# Patient Record
Sex: MALE | Race: WHITE | NOT HISPANIC OR LATINO | Employment: FULL TIME | URBAN - METROPOLITAN AREA
[De-identification: names, ages, dates, MRNs, and addresses within clinical notes are randomized per-mention and may not be internally consistent; named-entity substitution may affect disease eponyms.]

---

## 2018-01-26 ENCOUNTER — CLINICAL SUPPORT (OUTPATIENT)
Dept: URGENT CARE | Facility: CLINIC | Age: 48
End: 2018-01-26

## 2018-01-26 DIAGNOSIS — Z13.39 ALCOHOL SCREENING: ICD-10-CM

## 2018-01-26 DIAGNOSIS — Z02.83 ENCOUNTER FOR DRUG SCREENING: Primary | ICD-10-CM

## 2018-01-26 LAB — POC BREATH ALCOHOL: NEGATIVE

## 2018-01-26 PROCEDURE — 80305 DRUG TEST PRSMV DIR OPT OBS: CPT | Mod: S$GLB,,, | Performed by: PREVENTIVE MEDICINE

## 2018-01-26 PROCEDURE — 82075 ASSAY OF BREATH ETHANOL: CPT | Mod: S$GLB,,, | Performed by: PREVENTIVE MEDICINE

## 2018-08-21 ENCOUNTER — CLINICAL SUPPORT (OUTPATIENT)
Dept: OTHER | Facility: CLINIC | Age: 48
End: 2018-08-21
Payer: COMMERCIAL

## 2018-08-21 VITALS
WEIGHT: 224 LBS | DIASTOLIC BLOOD PRESSURE: 76 MMHG | SYSTOLIC BLOOD PRESSURE: 120 MMHG | HEIGHT: 72 IN | BODY MASS INDEX: 30.34 KG/M2

## 2018-08-21 DIAGNOSIS — Z00.8 HEALTH EXAMINATION IN POPULATION SURVEYS: Primary | ICD-10-CM

## 2018-08-21 LAB
GLUCOSE SERPL-MCNC: 184 MG/DL (ref 60–140)
POC CHOLESTEROL, HDL: 24 MG/DL (ref 40–?)
POC CHOLESTEROL, LDL: 62 MG/DL (ref ?–160)
POC CHOLESTEROL, TOTAL: 163 MG/DL (ref ?–240)
POC GLUCOSE FASTING: ABNORMAL MG/DL (ref 60–110)
POC TOTAL CHOLESTEROL / HDL RATIO: 6.7 (ref ?–6)
POC TRIGLYCERIDES: 381 MG/DL (ref ?–160)

## 2018-08-21 PROCEDURE — 82947 ASSAY GLUCOSE BLOOD QUANT: CPT | Mod: QW,S$GLB,, | Performed by: INTERNAL MEDICINE

## 2018-08-21 PROCEDURE — 99401 PREV MED CNSL INDIV APPRX 15: CPT | Mod: S$GLB,,, | Performed by: INTERNAL MEDICINE

## 2018-08-21 PROCEDURE — 80061 LIPID PANEL: CPT | Mod: QW,S$GLB,, | Performed by: INTERNAL MEDICINE

## 2019-09-24 ENCOUNTER — OFFICE VISIT (OUTPATIENT)
Dept: URGENT CARE | Facility: CLINIC | Age: 49
End: 2019-09-24
Payer: COMMERCIAL

## 2019-09-24 VITALS
BODY MASS INDEX: 30.34 KG/M2 | OXYGEN SATURATION: 98 % | RESPIRATION RATE: 18 BRPM | DIASTOLIC BLOOD PRESSURE: 99 MMHG | HEART RATE: 88 BPM | HEIGHT: 72 IN | WEIGHT: 224 LBS | SYSTOLIC BLOOD PRESSURE: 147 MMHG | TEMPERATURE: 98 F

## 2019-09-24 DIAGNOSIS — B35.6 TINEA CRURIS: Primary | ICD-10-CM

## 2019-09-24 PROCEDURE — 99214 OFFICE O/P EST MOD 30 MIN: CPT | Mod: S$GLB,,, | Performed by: FAMILY MEDICINE

## 2019-09-24 PROCEDURE — 3008F BODY MASS INDEX DOCD: CPT | Mod: CPTII,S$GLB,, | Performed by: FAMILY MEDICINE

## 2019-09-24 PROCEDURE — 99214 PR OFFICE/OUTPT VISIT, EST, LEVL IV, 30-39 MIN: ICD-10-PCS | Mod: S$GLB,,, | Performed by: FAMILY MEDICINE

## 2019-09-24 PROCEDURE — 3008F PR BODY MASS INDEX (BMI) DOCUMENTED: ICD-10-PCS | Mod: CPTII,S$GLB,, | Performed by: FAMILY MEDICINE

## 2019-09-24 RX ORDER — FLUCONAZOLE 150 MG/1
150 TABLET ORAL EVERY OTHER DAY
Qty: 3 TABLET | Refills: 0 | Status: SHIPPED | OUTPATIENT
Start: 2019-09-24 | End: 2019-09-30

## 2019-09-24 RX ORDER — BETAMETHASONE DIPROPIONATE 0.5 MG/G
CREAM TOPICAL
Refills: 0 | COMMUNITY
Start: 2019-09-16

## 2019-09-24 RX ORDER — KETOCONAZOLE 20 MG/G
CREAM TOPICAL DAILY
Qty: 1 TUBE | Refills: 0 | Status: SHIPPED | OUTPATIENT
Start: 2019-09-24 | End: 2019-09-27 | Stop reason: SDUPTHER

## 2019-09-24 RX ORDER — VALACYCLOVIR HYDROCHLORIDE 1 G/1
1000 TABLET, FILM COATED ORAL 3 TIMES DAILY
Refills: 0 | COMMUNITY
Start: 2019-09-11

## 2019-09-24 RX ORDER — PREDNISONE 10 MG/1
TABLET ORAL
Refills: 0 | COMMUNITY
Start: 2019-09-05

## 2019-09-24 RX ORDER — TERBINAFINE HYDROCHLORIDE 250 MG/1
TABLET ORAL
Refills: 1 | COMMUNITY
Start: 2019-08-16

## 2019-09-24 NOTE — PROGRESS NOTES
Subjective:       Patient ID: Amando Smith is a 49 y.o. male.    Vitals:  height is 6' (1.829 m) and weight is 101.6 kg (224 lb). His oral temperature is 97.6 °F (36.4 °C). His blood pressure is 147/99 (abnormal) and his pulse is 88. His respiration is 18 and oxygen saturation is 98%.     Chief Complaint: Rash    Rash   This is a recurrent problem. The current episode started more than 1 year ago. The problem has been gradually worsening since onset. The affected locations include the groin, left buttock, right upper leg, left upper leg, left hip and right buttock. The rash is characterized by itchiness, redness, swelling, dryness, pain and burning. He was exposed to nothing. Pertinent negatives include no cough, fever or sore throat. Past treatments include anti-itch cream (valtrex, lamictal, pred.). The treatment provided no relief.       Constitution: Negative for chills and fever.   HENT: Negative for facial swelling and sore throat.    Neck: Negative for painful lymph nodes.   Eyes: Negative for eye itching and eyelid swelling.   Respiratory: Negative for cough.    Musculoskeletal: Negative for joint pain and joint swelling.   Skin: Positive for rash. Negative for color change, pale, wound, abrasion, laceration, lesion, skin thickening/induration, puncture wound, abscess, avulsion and hives.   Allergic/Immunologic: Negative for environmental allergies, immunocompromised state and hives.   Hematologic/Lymphatic: Negative for swollen lymph nodes.       Objective:      Physical Exam   Constitutional: He is oriented to person, place, and time. He appears well-developed and well-nourished.   HENT:   Head: Normocephalic and atraumatic. Head is without abrasion, without contusion and without laceration.   Eyes: Pupils are equal, round, and reactive to light. Conjunctivae, EOM and lids are normal.   Neck: Trachea normal, full passive range of motion without pain and phonation normal. Neck supple.   Cardiovascular:  Normal rate, regular rhythm and normal heart sounds.   Pulmonary/Chest: Effort normal and breath sounds normal. No stridor. No respiratory distress.   Musculoskeletal: Normal range of motion.   Neurological: He is alert and oriented to person, place, and time.   Skin: Skin is warm, dry and intact. Capillary refill takes less than 2 seconds. No abrasion, no bruising, no burn, no ecchymosis, no laceration and no lesion noted.        Both buttock area has a large macular, dark, flat rash with erythematous elevated border.   Psychiatric: He has a normal mood and affect. His speech is normal and behavior is normal. Judgment and thought content normal. Cognition and memory are normal.   Nursing note and vitals reviewed.      Assessment:       1. Tinea cruris        Plan:         Tinea cruris  -     ketoconazole (NIZORAL) 2 % cream; Apply topically once daily. for 14 days  Dispense: 1 Tube; Refill: 0  -     fluconazole (DIFLUCAN) 150 MG Tab; Take 1 tablet (150 mg total) by mouth every other day. for 6 days  Dispense: 3 tablet; Refill: 0          Patient Instructions     Jock Itch (Tinea Cruris, General)  Jock itch (tinea cruris) is a red, itchy rash in the groin caused by a fungal infection. It occurs in skin folds where it is warm and moist. It commonly starts as a small, red, itchy patch that grows larger. The patch is usually in the shape of a round ring, 1 to 2 inches wide. It may cause the skin to flake. It may also spread to the scrotum or the skin that covers your testicles. This infection is treated with skin creams or oral medicine.  Home care  · If you were prescribed a cream, use it exactly as directed. You can buy some antifungal creams without a prescription.  · It may take a week before the fungus starts to go away. It can take about 2 to 3 weeks to completely clear. To stop the rash from coming back, keep using the medicine until the rash is all gone.  · Wash the area at least once a day with soap and water.  Pat dry and apply medicine.   · Wear loose-fitting underwear to let your skin breathe. Change your underwear daily.  · Once the rash is gone, keep the area clean and dry to prevent reinfection. If recurrence is a problem, use a medicated antifungal powder daily. This is available over the counter.  Prevention  The following tips may help prevent jock itch:  · Don't share clothes, towels, or sports gear with others unless they have been washed.  · Change your underwear daily.  · Keep skin clean and dry, especially after showering or swimming.  · Lose weight.  · Do not wear tight underwear.  · Treat athlete's foot if it occurs.  Follow-up care  Follow up with your healthcare provider, or as advised. Call your provider if the rash is not starting to improve after 10 days of treatment, or if the rash continues to spread.  When to seek medical advice  Call your healthcare provider right away if any of these occur:  · Increasing pain in the rash area  · Redness that spreads around the rash  · Fluid draining from the rash  · Rash returns soon after treatment  · Fever of 100.4°F (38°C) or higher, or as directed by your provider  Date Last Reviewed: 8/1/2016  © 6138-6270 [employer]. [address] [place] [address], [place] [address]. All rights reserved. This information is not intended as a substitute for professional medical care. Always follow your healthcare professional's instructions.    Follow up with your doctor in a few days as needed.  Return to the urgent care or go to the ER if symptoms get worse.    Tomy Siegel MD

## 2019-09-24 NOTE — PATIENT INSTRUCTIONS
Jock Itch (Tinea Cruris, General)  Jock itch (tinea cruris) is a red, itchy rash in the groin caused by a fungal infection. It occurs in skin folds where it is warm and moist. It commonly starts as a small, red, itchy patch that grows larger. The patch is usually in the shape of a round ring, 1 to 2 inches wide. It may cause the skin to flake. It may also spread to the scrotum or the skin that covers your testicles. This infection is treated with skin creams or oral medicine.  Home care  · If you were prescribed a cream, use it exactly as directed. You can buy some antifungal creams without a prescription.  · It may take a week before the fungus starts to go away. It can take about 2 to 3 weeks to completely clear. To stop the rash from coming back, keep using the medicine until the rash is all gone.  · Wash the area at least once a day with soap and water. Pat dry and apply medicine.   · Wear loose-fitting underwear to let your skin breathe. Change your underwear daily.  · Once the rash is gone, keep the area clean and dry to prevent reinfection. If recurrence is a problem, use a medicated antifungal powder daily. This is available over the counter.  Prevention  The following tips may help prevent jock itch:  · Don't share clothes, towels, or sports gear with others unless they have been washed.  · Change your underwear daily.  · Keep skin clean and dry, especially after showering or swimming.  · Lose weight.  · Do not wear tight underwear.  · Treat athlete's foot if it occurs.  Follow-up care  Follow up with your healthcare provider, or as advised. Call your provider if the rash is not starting to improve after 10 days of treatment, or if the rash continues to spread.  When to seek medical advice  Call your healthcare provider right away if any of these occur:  · Increasing pain in the rash area  · Redness that spreads around the rash  · Fluid draining from the rash  · Rash returns soon after treatment  · Fever  of 100.4°F (38°C) or higher, or as directed by your provider  Date Last Reviewed: 8/1/2016  © 1517-5208 [employer]. [address] [place] [address], [place] [address]. All rights reserved. This information is not intended as a substitute for professional medical care. Always follow your healthcare professional's instructions.    Follow up with your doctor in a few days as needed.  Return to the urgent care or go to the ER if symptoms get worse.    Tomy Siegel MD

## 2019-09-27 DIAGNOSIS — B35.6 TINEA CRURIS: ICD-10-CM

## 2019-10-01 RX ORDER — KETOCONAZOLE 20 MG/G
CREAM TOPICAL
Qty: 15 G | Refills: 0 | Status: SHIPPED | OUTPATIENT
Start: 2019-10-01

## 2020-02-01 ENCOUNTER — OFFICE VISIT (OUTPATIENT)
Dept: URGENT CARE | Facility: CLINIC | Age: 50
End: 2020-02-01
Payer: COMMERCIAL

## 2020-02-01 VITALS
OXYGEN SATURATION: 96 % | HEART RATE: 92 BPM | HEIGHT: 72 IN | WEIGHT: 224 LBS | DIASTOLIC BLOOD PRESSURE: 79 MMHG | TEMPERATURE: 98 F | SYSTOLIC BLOOD PRESSURE: 145 MMHG | BODY MASS INDEX: 30.34 KG/M2

## 2020-02-01 DIAGNOSIS — L02.419 CELLULITIS AND ABSCESS OF UPPER EXTREMITY: Primary | ICD-10-CM

## 2020-02-01 DIAGNOSIS — L03.119 CELLULITIS AND ABSCESS OF UPPER EXTREMITY: Primary | ICD-10-CM

## 2020-02-01 PROCEDURE — 99214 OFFICE O/P EST MOD 30 MIN: CPT | Mod: 25,S$GLB,, | Performed by: NURSE PRACTITIONER

## 2020-02-01 PROCEDURE — 99214 PR OFFICE/OUTPT VISIT, EST, LEVL IV, 30-39 MIN: ICD-10-PCS | Mod: 25,S$GLB,, | Performed by: NURSE PRACTITIONER

## 2020-02-01 PROCEDURE — 10060 INCISION & DRAINAGE: ICD-10-PCS | Mod: S$GLB,,, | Performed by: NURSE PRACTITIONER

## 2020-02-01 PROCEDURE — 10060 I&D ABSCESS SIMPLE/SINGLE: CPT | Mod: S$GLB,,, | Performed by: NURSE PRACTITIONER

## 2020-02-01 RX ORDER — MUPIROCIN 20 MG/G
OINTMENT TOPICAL 3 TIMES DAILY
Qty: 30 G | Refills: 0 | Status: SHIPPED | OUTPATIENT
Start: 2020-02-01

## 2020-02-01 RX ORDER — CLINDAMYCIN HYDROCHLORIDE 300 MG/1
300 CAPSULE ORAL EVERY 6 HOURS
Qty: 28 CAPSULE | Refills: 0 | Status: SHIPPED | OUTPATIENT
Start: 2020-02-01 | End: 2020-02-08

## 2020-02-01 RX ORDER — CLINDAMYCIN PHOSPHATE 150 MG/ML
600 INJECTION, SOLUTION INTRAVENOUS
Status: COMPLETED | OUTPATIENT
Start: 2020-02-01 | End: 2020-02-01

## 2020-02-01 RX ORDER — METFORMIN HYDROCHLORIDE 500 MG/1
TABLET ORAL
COMMUNITY
Start: 2020-01-31

## 2020-02-01 RX ORDER — MUPIROCIN 20 MG/G
OINTMENT TOPICAL
COMMUNITY
Start: 2020-01-30

## 2020-02-01 RX ADMIN — CLINDAMYCIN PHOSPHATE 600 MG: 150 INJECTION, SOLUTION INTRAVENOUS at 03:02

## 2020-02-01 NOTE — PATIENT INSTRUCTIONS
PLEASE READ YOUR DISCHARGE INSTRUCTIONS ENTIRELY AS IT CONTAINS IMPORTANT INFORMATION.    Please return here or go to the Emergency Department for any concerns or worsening of condition.    If you were prescribed antibiotics, please take them to completion.    If not allergic, please take over the counter Tylenol (Acetaminophen) and/or Motrin (Ibuprofen) as directed for control of pain and/or fever.  Please follow up with your primary care doctor or specialist as needed.    Take a shower for personal hygiene, no bath.  Do not soak wound unless specifically instructed.  If packing in place, return to clinic for wound check as instructed.  If packing falls out before wound check, call clinic for instruction.  If this is a recurrent issue, use hibiclens three times a week as body wash to help prevent future abscess(es), let stay on skin for 5 minutes before rinsing.off.  If placed on antibiotics, complete them.  The abscess may drain for a day or two, keep covered while out.     If you  smoke, please stop smoking.        Please return or see your primary care doctor if you develop new or worsening symptoms.     Please arrange follow up with your primary medical clinic as soon as possible. You must understand that you've received an Urgent Care treatment only and that you may be released before all of your medical problems are known or treated. You, the patient, will arrange for follow up as instructed. If your symptoms worsen or fail to improve you should go to the Emergency Room.        Abscess (Incision & Drainage)  An abscess is sometimes called a boil. It happens when bacteria get trapped under the skin and start to grow. Pus forms inside the abscess as the body responds to the bacteria. An abscess can happen with an insect bite, ingrown hair, blocked oil gland, pimple, cyst, or puncture wound.  Your healthcare provider has drained the pus from your abscess. If the abscess pocket was large, your healthcare  provider may have put in gauze packing. Your provider will need to remove it on your next visit. He or she may also replace it at that time. You may not need antibiotics to treat a simple abscess, unless the infection is spreading into the skin around the wound (cellulitis).  The wound will take about 1 to 2 weeks to heal, depending on the size of the abscess. Healthy tissue will grow from the bottom and sides of the opening until it seals over.  Home care  These tips can help your wound heal:  · The wound may drain for the first 2 days. Cover the wound with a clean dry dressing. Change the dressing if it becomes soaked with blood or pus.  · If a gauze packing was placed inside the abscess pocket, you may be told to remove it yourself. You may do this in the shower. Once the packing is removed, you should wash the area in the shower, or clean the area as directed by your provider. Continue to do this until the skin opening has closed. Make sure you wash your hands after changing the packing or cleaning the wound.  · If you were prescribed antibiotics, take them as directed until they are all gone.  · You may use acetaminophen or ibuprofen to control pain, unless another pain medicine was prescribed. If you have liver disease or ever had a stomach ulcer, talk with your doctor before using these medicines.  Follow-up care  Follow up with your healthcare provider, or as advised. If a gauze packing was put in your wound, it should be removed in 1 to 2 days. Check your wound every day for any signs that the infection is getting worse. The signs are listed below.  When to seek medical advice  Call your healthcare provider right away if any of these occur:  · Increasing redness or swelling  · Red streaks in the skin leading away from the wound  · Increasing local pain or swelling  · Continued pus draining from the wound 2 days after treatment  · Fever of 100.4ºF (38ºC) or higher, or as directed by your healthcare  provider  · Boil returns when you are at home  Date Last Reviewed: 9/1/2016  © 2077-0349 [employer], [employer] [address] [place] [address], [place] [address]. All rights reserved. This information is not intended as a substitute for professional medical care. Always follow your healthcare professional's instructions.

## 2020-02-01 NOTE — PROGRESS NOTES
Subjective:       Patient ID: Amando Smith is a 49 y.o. male.    Vitals:  height is 6' (1.829 m) and weight is 101.6 kg (224 lb). His oral temperature is 97.7 °F (36.5 °C). His blood pressure is 145/79 (abnormal) and his pulse is 92. His oxygen saturation is 96%.     Chief Complaint: Insect Bite    Pt here today for c/o right forearm bug bite and worsening infection x3 days. Pt currently taking Bactrim DS and Mupirocin ointment prescribed by PCP, but redness appears to be spreading and is worse this morning. Denies fever, body aches, pain radiation, tingling, or numbness to site.    Insect Bite   This is a new problem. The current episode started in the past 7 days (Wednesday). The problem has been gradually worsening. Pertinent negatives include no abdominal pain, anorexia, arthralgias, change in bowel habit, chest pain, chills, congestion, coughing, diaphoresis, fatigue, fever, headaches, joint swelling, myalgias, nausea, neck pain, numbness, rash, sore throat, swollen glands, urinary symptoms, vertigo, visual change, vomiting or weakness. Associated symptoms comments: Painful, red. Nothing aggravates the symptoms. Treatments tried: oitment. The treatment provided no relief.       Constitution: Negative for chills, sweating, fatigue and fever.   HENT: Negative for congestion and sore throat.    Neck: Negative for neck pain and painful lymph nodes.   Cardiovascular: Negative for chest pain and leg swelling.   Eyes: Negative for double vision and blurred vision.   Respiratory: Negative for cough and shortness of breath.    Gastrointestinal: Negative for abdominal pain, nausea, vomiting and diarrhea.   Genitourinary: Negative for dysuria, frequency and urgency.   Musculoskeletal: Negative for joint pain, joint swelling, muscle cramps and muscle ache.   Skin: Positive for abscess (right forearm). Negative for color change, pale, rash and erythema.   Allergic/Immunologic: Negative for seasonal allergies.  "  Neurological: Negative for dizziness, history of vertigo, light-headedness, passing out, headaches and numbness.   Hematologic/Lymphatic: Negative for swollen lymph nodes, easy bruising/bleeding and history of blood clots. Does not bruise/bleed easily.   Psychiatric/Behavioral: Negative for nervous/anxious, sleep disturbance and depression. The patient is not nervous/anxious.        Objective:      Physical Exam   Constitutional: He is oriented to person, place, and time. He appears well-developed and well-nourished.  Non-toxic appearance. He does not have a sickly appearance. He does not appear ill. No distress.       HENT:   Head: Normocephalic and atraumatic. Head is without abrasion, without contusion and without laceration.   Right Ear: External ear normal.   Left Ear: External ear normal.   Nose: Nose normal.   Mouth/Throat: Oropharynx is clear and moist and mucous membranes are normal.   Eyes: Pupils are equal, round, and reactive to light. Conjunctivae, EOM and lids are normal.   Neck: Trachea normal, full passive range of motion without pain and phonation normal. Neck supple.   Cardiovascular: Normal rate, regular rhythm and normal heart sounds.   Pulmonary/Chest: Effort normal and breath sounds normal. No stridor. No respiratory distress.   Musculoskeletal: Normal range of motion.   Neurological: He is alert and oriented to person, place, and time.   Skin: Skin is warm, dry, intact, no rash and abscessed (right forearm). Capillary refill takes less than 2 seconds. abrasion, burn, bruising, erythema and ecchymosis  Psychiatric: He has a normal mood and affect. His speech is normal and behavior is normal. Judgment and thought content normal. Cognition and memory are normal.   Nursing note and vitals reviewed.        Incision & Drainage  Date/Time: 2/1/2020 2:05 PM  Performed by: Nakia Carver NP  Authorized by: Nakia Carver NP     Time out: Immediately prior to procedure a "time out" was called to " verify the correct patient, procedure, equipment, support staff and site/side marked as required.    Consent Done?:  Yes (Verbal)    Type:  Abscess  Body area:  Upper extremity  Location details:  Right arm  Local anesthetic: lidocaine 1% without epinephrine  Anesthetic total (ml):  2  Scalpel size:  11  Incision type:  Single straight  Complexity:  Simple  Drainage:  Serosanguinous and bloody  Drainage amount:  Moderate  Wound treatment:  Incision, drainage, deloculation and expression of material  Patient tolerance:  Patient tolerated the procedure well with no immediate complications      Assessment:       1. Cellulitis and abscess of upper extremity        Plan:         Cellulitis and abscess of upper extremity  -     clindamycin injection 600 mg  -     clindamycin (CLEOCIN) 300 MG capsule; Take 1 capsule (300 mg total) by mouth every 6 (six) hours. for 7 days  Dispense: 28 capsule; Refill: 0  -     mupirocin (BACTROBAN) 2 % ointment; Apply topically 3 (three) times daily.  Dispense: 30 g; Refill: 0    I&D performed. Pt tolerated well. Site cleaned and dressed with Mupirocin ointment. Diagnosis and treatment plan discussed with pt. Pt instructed to discontinue Bactrim, and begin taking oral Clindamycin tomorrow. Instructed to follow up with PCP or go to ER if symptoms fail to improve or worsen. Pt verbalizes understanding.     Patient Instructions       PLEASE READ YOUR DISCHARGE INSTRUCTIONS ENTIRELY AS IT CONTAINS IMPORTANT INFORMATION.    Please return here or go to the Emergency Department for any concerns or worsening of condition.    If you were prescribed antibiotics, please take them to completion.    If not allergic, please take over the counter Tylenol (Acetaminophen) and/or Motrin (Ibuprofen) as directed for control of pain and/or fever.  Please follow up with your primary care doctor or specialist as needed.    Take a shower for personal hygiene, no bath.  Do not soak wound unless specifically  instructed.  If packing in place, return to clinic for wound check as instructed.  If packing falls out before wound check, call clinic for instruction.  If this is a recurrent issue, use hibiclens three times a week as body wash to help prevent future abscess(es), let stay on skin for 5 minutes before rinsing.off.  If placed on antibiotics, complete them.  The abscess may drain for a day or two, keep covered while out.     If you  smoke, please stop smoking.        Please return or see your primary care doctor if you develop new or worsening symptoms.     Please arrange follow up with your primary medical clinic as soon as possible. You must understand that you've received an Urgent Care treatment only and that you may be released before all of your medical problems are known or treated. You, the patient, will arrange for follow up as instructed. If your symptoms worsen or fail to improve you should go to the Emergency Room.        Abscess (Incision & Drainage)  An abscess is sometimes called a boil. It happens when bacteria get trapped under the skin and start to grow. Pus forms inside the abscess as the body responds to the bacteria. An abscess can happen with an insect bite, ingrown hair, blocked oil gland, pimple, cyst, or puncture wound.  Your healthcare provider has drained the pus from your abscess. If the abscess pocket was large, your healthcare provider may have put in gauze packing. Your provider will need to remove it on your next visit. He or she may also replace it at that time. You may not need antibiotics to treat a simple abscess, unless the infection is spreading into the skin around the wound (cellulitis).  The wound will take about 1 to 2 weeks to heal, depending on the size of the abscess. Healthy tissue will grow from the bottom and sides of the opening until it seals over.  Home care  These tips can help your wound heal:  · The wound may drain for the first 2 days. Cover the wound with a  clean dry dressing. Change the dressing if it becomes soaked with blood or pus.  · If a gauze packing was placed inside the abscess pocket, you may be told to remove it yourself. You may do this in the shower. Once the packing is removed, you should wash the area in the shower, or clean the area as directed by your provider. Continue to do this until the skin opening has closed. Make sure you wash your hands after changing the packing or cleaning the wound.  · If you were prescribed antibiotics, take them as directed until they are all gone.  · You may use acetaminophen or ibuprofen to control pain, unless another pain medicine was prescribed. If you have liver disease or ever had a stomach ulcer, talk with your doctor before using these medicines.  Follow-up care  Follow up with your healthcare provider, or as advised. If a gauze packing was put in your wound, it should be removed in 1 to 2 days. Check your wound every day for any signs that the infection is getting worse. The signs are listed below.  When to seek medical advice  Call your healthcare provider right away if any of these occur:  · Increasing redness or swelling  · Red streaks in the skin leading away from the wound  · Increasing local pain or swelling  · Continued pus draining from the wound 2 days after treatment  · Fever of 100.4ºF (38ºC) or higher, or as directed by your healthcare provider  · Boil returns when you are at home  Date Last Reviewed: 9/1/2016  © 3258-1691 [employer]. [address] [place] [address], [place] [address]. All rights reserved. This information is not intended as a substitute for professional medical care. Always follow your healthcare professional's instructions.

## 2021-04-15 ENCOUNTER — PATIENT MESSAGE (OUTPATIENT)
Dept: RESEARCH | Facility: HOSPITAL | Age: 51
End: 2021-04-15

## 2021-05-19 ENCOUNTER — CLINICAL SUPPORT (OUTPATIENT)
Dept: OTHER | Facility: CLINIC | Age: 51
End: 2021-05-19
Payer: COMMERCIAL

## 2021-05-19 DIAGNOSIS — Z00.8 ENCOUNTER FOR OTHER GENERAL EXAMINATION: ICD-10-CM

## 2021-05-20 VITALS — BODY MASS INDEX: 30.38 KG/M2 | HEIGHT: 72 IN

## 2021-05-20 LAB
HDLC SERPL-MCNC: 22 MG/DL
POC CHOLESTEROL, LDL (DOCK): 57 MG/DL
POC CHOLESTEROL, TOTAL: 140 MG/DL
POC GLUCOSE, FASTING: 189 MG/DL (ref 60–110)
TRIGL SERPL-MCNC: 299 MG/DL

## 2021-06-01 ENCOUNTER — NURSE TRIAGE (OUTPATIENT)
Dept: ADMINISTRATIVE | Facility: CLINIC | Age: 51
End: 2021-06-01

## 2021-06-14 ENCOUNTER — TELEPHONE (OUTPATIENT)
Dept: SURGERY | Facility: CLINIC | Age: 51
End: 2021-06-14

## 2021-06-15 ENCOUNTER — OFFICE VISIT (OUTPATIENT)
Dept: SURGERY | Facility: CLINIC | Age: 51
End: 2021-06-15
Payer: COMMERCIAL

## 2021-06-15 VITALS
HEART RATE: 88 BPM | BODY MASS INDEX: 29.84 KG/M2 | WEIGHT: 220 LBS | SYSTOLIC BLOOD PRESSURE: 131 MMHG | DIASTOLIC BLOOD PRESSURE: 82 MMHG

## 2021-06-15 DIAGNOSIS — K60.2 ANAL FISSURE: Primary | ICD-10-CM

## 2021-06-15 PROCEDURE — 99214 PR OFFICE/OUTPT VISIT, EST, LEVL IV, 30-39 MIN: ICD-10-PCS | Mod: S$GLB,,, | Performed by: NURSE PRACTITIONER

## 2021-06-15 PROCEDURE — 3008F PR BODY MASS INDEX (BMI) DOCUMENTED: ICD-10-PCS | Mod: CPTII,S$GLB,, | Performed by: NURSE PRACTITIONER

## 2021-06-15 PROCEDURE — 99999 PR PBB SHADOW E&M-EST. PATIENT-LVL III: ICD-10-PCS | Mod: PBBFAC,,, | Performed by: NURSE PRACTITIONER

## 2021-06-15 PROCEDURE — 99214 OFFICE O/P EST MOD 30 MIN: CPT | Mod: S$GLB,,, | Performed by: NURSE PRACTITIONER

## 2021-06-15 PROCEDURE — 99999 PR PBB SHADOW E&M-EST. PATIENT-LVL III: CPT | Mod: PBBFAC,,, | Performed by: NURSE PRACTITIONER

## 2021-06-15 PROCEDURE — 3008F BODY MASS INDEX DOCD: CPT | Mod: CPTII,S$GLB,, | Performed by: NURSE PRACTITIONER

## 2022-05-11 ENCOUNTER — CLINICAL SUPPORT (OUTPATIENT)
Dept: OTHER | Facility: CLINIC | Age: 52
End: 2022-05-11
Payer: COMMERCIAL

## 2022-05-11 DIAGNOSIS — Z00.8 ENCOUNTER FOR OTHER GENERAL EXAMINATION: ICD-10-CM

## 2022-05-12 VITALS
DIASTOLIC BLOOD PRESSURE: 86 MMHG | BODY MASS INDEX: 29.8 KG/M2 | HEIGHT: 72 IN | SYSTOLIC BLOOD PRESSURE: 156 MMHG | WEIGHT: 220 LBS

## 2022-05-12 LAB
GLUCOSE SERPL-MCNC: 235 MG/DL (ref 60–140)
HDLC SERPL-MCNC: 22 MG/DL
POC CHOLESTEROL, LDL (DOCK): 71.18 MG/DL
POC CHOLESTEROL, TOTAL: 132 MG/DL
TRIGL SERPL-MCNC: 234 MG/DL

## 2023-01-31 DIAGNOSIS — M79.642 LEFT HAND PAIN: Primary | ICD-10-CM

## 2023-02-02 ENCOUNTER — TELEPHONE (OUTPATIENT)
Dept: ORTHOPEDICS | Facility: CLINIC | Age: 53
End: 2023-02-02
Payer: COMMERCIAL

## 2023-02-02 ENCOUNTER — PATIENT MESSAGE (OUTPATIENT)
Dept: ORTHOPEDICS | Facility: CLINIC | Age: 53
End: 2023-02-02
Payer: COMMERCIAL

## 2023-02-03 ENCOUNTER — HOSPITAL ENCOUNTER (OUTPATIENT)
Dept: RADIOLOGY | Facility: HOSPITAL | Age: 53
Discharge: HOME | End: 2023-02-03
Attending: PHYSICIAN ASSISTANT
Payer: COMMERCIAL

## 2023-02-03 ENCOUNTER — OFFICE VISIT (OUTPATIENT)
Dept: ORTHOPEDICS | Facility: CLINIC | Age: 53
End: 2023-02-03
Payer: COMMERCIAL

## 2023-02-03 DIAGNOSIS — M72.0 DUPUYTREN'S CONTRACTURE: Primary | ICD-10-CM

## 2023-02-03 DIAGNOSIS — M79.642 LEFT HAND PAIN: ICD-10-CM

## 2023-02-03 PROCEDURE — 73130 X-RAY EXAM OF HAND: CPT | Mod: TC,LT

## 2023-02-03 PROCEDURE — 73130 X-RAY EXAM OF HAND: CPT | Mod: 26,LT,, | Performed by: RADIOLOGY

## 2023-02-03 PROCEDURE — 99999 PR PBB SHADOW E&M-EST. PATIENT-LVL III: CPT | Mod: PBBFAC,,, | Performed by: PHYSICIAN ASSISTANT

## 2023-02-03 PROCEDURE — 99999 PR PBB SHADOW E&M-EST. PATIENT-LVL III: ICD-10-PCS | Mod: PBBFAC,,, | Performed by: PHYSICIAN ASSISTANT

## 2023-02-03 PROCEDURE — 1159F PR MEDICATION LIST DOCUMENTED IN MEDICAL RECORD: ICD-10-PCS | Mod: CPTII,S$GLB,, | Performed by: PHYSICIAN ASSISTANT

## 2023-02-03 PROCEDURE — 1159F MED LIST DOCD IN RCRD: CPT | Mod: CPTII,S$GLB,, | Performed by: PHYSICIAN ASSISTANT

## 2023-02-03 PROCEDURE — 73130 XR HAND COMPLETE 3 VIEW LEFT: ICD-10-PCS | Mod: 26,LT,, | Performed by: RADIOLOGY

## 2023-02-03 PROCEDURE — 99203 OFFICE O/P NEW LOW 30 MIN: CPT | Mod: S$GLB,,, | Performed by: PHYSICIAN ASSISTANT

## 2023-02-03 PROCEDURE — 99203 PR OFFICE/OUTPT VISIT, NEW, LEVL III, 30-44 MIN: ICD-10-PCS | Mod: S$GLB,,, | Performed by: PHYSICIAN ASSISTANT

## 2023-02-03 NOTE — PROGRESS NOTES
"[facility]  History & Physical  Orthopedics    SUBJECTIVE:      Chief Complaint: Left ring finger    Referring Provider: Roula Joahnsen Dr. is the supervising physician for this encounter/patient    History of Present Illness:  Patient is a 52 y.o. right hand dominant male who presents today with complaints of left ring finger decreased motion that he noticed several months ago. No trauma/injury. He reports having had "callus" on his hands, but then noticed that the left ring finger would not fully extend. This is not painful, but he reports hard nodules to his palm. No numbness/tingling. No surgical history on the hands.     The patient is a/an automotive dealer.    Onset of symptoms/DOI was several months.    Symptoms are aggravated by movement.    Symptoms are alleviated by  none .    Symptoms consist of decreased ROM.    The patient rates their pain as a 0/10.    Attempted treatment(s) and/or interventions include activity modifications, rest.     The patient denies any fevers, chills, N/V, D/C and presents for evaluation.       Past Medical History:   Diagnosis Date    Diabetes      History reviewed. No pertinent surgical history.  Review of patient's allergies indicates:  No Known Allergies  Social History     Social History Narrative    Not on file     No family history on file.      Current Outpatient Medications:     augmented betamethasone dipropionate (DIPROLENE-AF) 0.05 % cream, MARKELL AA BID, Disp: , Rfl: 0    diltiazem HCl (DILTIAZEM 2% CREAM), Apply topically 3 (three) times daily. Apply topically to anal area., Disp: 30 g, Rfl: 2    ketoconazole (NIZORAL) 2 % cream, APPLY EXTERNALLY TO THE AFFECTED AREA EVERY DAY FOR 14 DAYS (Patient not taking: Reported on 2/1/2020), Disp: 15 g, Rfl: 0    metFORMIN (GLUCOPHAGE) 500 MG tablet, , Disp: , Rfl:     mupirocin (BACTROBAN) 2 % ointment, , Disp: , Rfl:     mupirocin (BACTROBAN) 2 % ointment, Apply topically 3 (three) times " daily., Disp: 30 g, Rfl: 0    predniSONE (DELTASONE) 10 MG tablet, TAKE 4 TABS DAILY X2 DAYS THEN 3 TABS DAILY X2 DAYS THEN 2 TABS DAILY X2 DAYS THEN 1 TAB DAILY, Disp: , Rfl: 0    terbinafine HCl (LAMISIL) 250 mg tablet, TK 1 T PO QD, Disp: , Rfl: 1    valACYclovir (VALTREX) 1000 MG tablet, Take 1,000 mg by mouth 3 (three) times daily., Disp: , Rfl: 0      Review of Systems:  Constitutional: no fever or chills  Eyes: no visual changes  ENT: no nasal congestion or sore throat  Respiratory: no cough or shortness of breath  Cardiovascular: no chest pain  Gastrointestinal: no nausea or vomiting, tolerating diet  Musculoskeletal: decreased ROM    OBJECTIVE:      Vital Signs (Most Recent):  There were no vitals filed for this visit.  There is no height or weight on file to calculate BMI.      Physical Exam:  Constitutional: The patient appears well-developed and well-nourished. No distress.   Skin: No lesions appreciated  Head: Normocephalic and atraumatic.   Nose: Nose normal.   Ears: No deformities seen  Eyes: Conjunctivae and EOM are normal.   Neck: No tracheal deviation present.   Cardiovascular: Normal rate and intact distal pulses.    Pulmonary/Chest: Effort normal. No respiratory distress.   Abdominal: There is no guarding.   Neurological: The patient is alert.   Psychiatric: The patient has a normal mood and affect.     Left Hand/Wrist Examination:    Observation/Inspection:  Swelling  none    Deformity  none  Discoloration  none     Scars   none    Atrophy  none  Definite Dupuytrens nodules noted to bilateral hands, most notable to the left small/ring/long fingers    HAND/WRIST EXAMINATION:  Finkelstein's Test   Neg  WHAT Test    Neg  Snuff box tenderness   Neg  Paul's Test    Neg  Hook of Hamate Tenderness  Neg  CMC grind    Neg  Circumduction test   Neg  Severe thickening of the fascial tissue noted in the palm of the small/ring/long fingers, also at the A1 pulley of the thumb. The thickening does extend  into the proximal phalanges of the small and ring fingers.     Neurovascular Exam:  Digits WWP, brisk CR < 3s throughout  NVI motor/LTS to M/R/U nerves, radial pulse 2+  Tinel's Test - Carpal Tunnel  Neg  Tinel's Test - Cubital Tunnel  Neg  Phalen's Test    Neg  Median Nerve Compression Test Neg    ROM hand: no contracture noted of the thumb, index and small fingers. However contractures noted at the ring and the PIP of the long finger.    ROM wrist full, painless    ROM elbow full, painless    Abdomen not guarded  Respirations nonlabored  Perfusion intact                  Diagnostic Results:     Imaging - I independently viewed the patient's imaging as well as the radiology report.      FINDINGS:  Bones are fairly well mineralized.  Narrowing of the IP joints.  Narrowing the radiocarpal joint.  Narrowing of the MCP joints.  No acute fracture seen.     Impression:     See above    ASSESSMENT/PLAN:      52 y.o. yo male with Bilateral Dupuytrens Disease, contractures only on the Left ring/long fingers    Plan: The patient and I had a thorough discussion today.  We discussed the working diagnosis as well as several other potential alternative diagnoses.  Treatment options were discussed, both conservative and surgical.  Conservative treatment options would include things such as activity modifications, workplace modifications, a period of rest, oral vs topical OTC and prescription anti-inflammatory medications, occupational therapy, splinting/bracing, immobilization, corticosteroid injections, and others.  Surgical options were discussed as well.     At this time, the patient would like to schedule an appointment to see Dr. Jacobs, I do think this is best since contractures involve 2 fingers. He tells me that his insurance will change in May, would prefer the surgery be done in the summer. I do give him the surgical information and number to central pricing. F/u with Dr. Jacobs in clinic for surgical  evaluation.    Should the patient's symptoms worsen, persist, or fail to improve they should return for reevaluation and I would be happy to see them back anytime.           Please do not hesitate to reach out to us via email, phone, or MyChart with any questions, concerns, or feedback.

## 2023-03-28 ENCOUNTER — OFFICE VISIT (OUTPATIENT)
Dept: ORTHOPEDICS | Facility: CLINIC | Age: 53
End: 2023-03-28
Payer: COMMERCIAL

## 2023-03-28 DIAGNOSIS — M72.0 DUPUYTREN'S CONTRACTURE: Primary | ICD-10-CM

## 2023-03-28 PROCEDURE — 99213 OFFICE O/P EST LOW 20 MIN: CPT | Mod: S$GLB,,, | Performed by: ORTHOPAEDIC SURGERY

## 2023-03-28 PROCEDURE — 99999 PR PBB SHADOW E&M-EST. PATIENT-LVL II: ICD-10-PCS | Mod: PBBFAC,,, | Performed by: ORTHOPAEDIC SURGERY

## 2023-03-28 PROCEDURE — 1159F PR MEDICATION LIST DOCUMENTED IN MEDICAL RECORD: ICD-10-PCS | Mod: CPTII,S$GLB,, | Performed by: ORTHOPAEDIC SURGERY

## 2023-03-28 PROCEDURE — 99999 PR PBB SHADOW E&M-EST. PATIENT-LVL II: CPT | Mod: PBBFAC,,, | Performed by: ORTHOPAEDIC SURGERY

## 2023-03-28 PROCEDURE — 1159F MED LIST DOCD IN RCRD: CPT | Mod: CPTII,S$GLB,, | Performed by: ORTHOPAEDIC SURGERY

## 2023-03-28 PROCEDURE — 99213 PR OFFICE/OUTPT VISIT, EST, LEVL III, 20-29 MIN: ICD-10-PCS | Mod: S$GLB,,, | Performed by: ORTHOPAEDIC SURGERY

## 2023-03-28 NOTE — PROGRESS NOTES
"[facility]  History & Physical  Orthopedics    SUBJECTIVE:      Chief Complaint: Left ring finger    Referring Provider: No ref. provider found     Dr. Jacobs is the supervising physician for this encounter/patient    History of Present Illness:  Patient is a 52 y.o. right hand dominant male who presents today with complaints of left ring finger decreased motion that he noticed several months ago. No trauma/injury. He reports having had "callus" on his hands, but then noticed that the left ring finger would not fully extend. This is not painful, but he reports hard nodules to his palm. No numbness/tingling. No surgical history on the hands.     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  This interferes with his wedding band does have a contracture of the PIP joint.  He presents to discuss options today.    The patient is a/an automotive dealer.    Onset of symptoms/DOI was several months.    Symptoms are aggravated by movement.    Symptoms are alleviated by  none .    Symptoms consist of decreased ROM.    The patient rates their pain as a 0/10.    Attempted treatment(s) and/or interventions include activity modifications, rest.     The patient denies any fevers, chills, N/V, D/C and presents for evaluation.       Past Medical History:   Diagnosis Date    Diabetes      No past surgical history on file.  Review of patient's allergies indicates:  No Known Allergies  Social History     Social History Narrative    Not on file     No family history on file.      Current Outpatient Medications:     augmented betamethasone dipropionate (DIPROLENE-AF) 0.05 % cream, MARKELL AA BID, Disp: , Rfl: 0    diltiazem HCl (DILTIAZEM 2% CREAM), Apply topically 3 (three) times daily. Apply topically to anal area., Disp: 30 g, Rfl: 2    ketoconazole (NIZORAL) 2 % cream, APPLY " EXTERNALLY TO THE AFFECTED AREA EVERY DAY FOR 14 DAYS (Patient not taking: Reported on 2/1/2020), Disp: 15 g, Rfl: 0    metFORMIN (GLUCOPHAGE) 500 MG tablet, , Disp: , Rfl:     mupirocin (BACTROBAN) 2 % ointment, , Disp: , Rfl:     mupirocin (BACTROBAN) 2 % ointment, Apply topically 3 (three) times daily., Disp: 30 g, Rfl: 0    predniSONE (DELTASONE) 10 MG tablet, TAKE 4 TABS DAILY X2 DAYS THEN 3 TABS DAILY X2 DAYS THEN 2 TABS DAILY X2 DAYS THEN 1 TAB DAILY, Disp: , Rfl: 0    terbinafine HCl (LAMISIL) 250 mg tablet, TK 1 T PO QD, Disp: , Rfl: 1    valACYclovir (VALTREX) 1000 MG tablet, Take 1,000 mg by mouth 3 (three) times daily., Disp: , Rfl: 0      Review of Systems:  Constitutional: no fever or chills  Eyes: no visual changes  ENT: no nasal congestion or sore throat  Respiratory: no cough or shortness of breath  Cardiovascular: no chest pain  Gastrointestinal: no nausea or vomiting, tolerating diet  Musculoskeletal: decreased ROM    OBJECTIVE:      Vital Signs (Most Recent):  There were no vitals filed for this visit.  There is no height or weight on file to calculate BMI.      Physical Exam:  Constitutional: The patient appears well-developed and well-nourished. No distress.   Skin: No lesions appreciated  Head: Normocephalic and atraumatic.   Nose: Nose normal.   Ears: No deformities seen  Eyes: Conjunctivae and EOM are normal.   Neck: No tracheal deviation present.   Cardiovascular: Normal rate and intact distal pulses.    Pulmonary/Chest: Effort normal. No respiratory distress.   Abdominal: There is no guarding.   Neurological: The patient is alert.   Psychiatric: The patient has a normal mood and affect.     Left Hand/Wrist Examination:    Observation/Inspection:  Swelling  none    Deformity  none  Discoloration  none     Scars   none    Atrophy  none  Definite Dupuytrens nodules noted to bilateral hands, most notable to the left small/ring/long fingers  PIP contracture of the left ring finger approximately  degrees with minimal MCP contracture the left small ring and long fingers    HAND/WRIST EXAMINATION:  Finkelstein's Test   Neg  WHAT Test    Neg  Snuff box tenderness   Neg  Paul's Test    Neg  Hook of Hamate Tenderness  Neg  CMC grind    Neg  Circumduction test   Neg  Severe thickening of the fascial tissue noted in the palm of the small/ring/long fingers, also at the A1 pulley of the thumb. The thickening does extend into the proximal phalanges of the small and ring fingers.     Neurovascular Exam:  Digits WWP, brisk CR < 3s throughout  NVI motor/LTS to M/R/U nerves, radial pulse 2+  Tinel's Test - Carpal Tunnel  Neg  Tinel's Test - Cubital Tunnel  Neg  Phalen's Test    Neg  Median Nerve Compression Test Neg    ROM hand: no contracture noted of the thumb, index and small fingers. However contractures noted at the ring and the PIP of the long finger.    ROM wrist full, painless    ROM elbow full, painless    Abdomen not guarded  Respirations nonlabored  Perfusion intact                  Diagnostic Results:     Imaging - I independently viewed the patient's imaging as well as the radiology report.      FINDINGS:  Bones are fairly well mineralized.  Narrowing of the IP joints.  Narrowing the radiocarpal joint.  Narrowing of the MCP joints.  No acute fracture seen.     Impression:     See above    ASSESSMENT/PLAN:      52 y.o. yo male with left ring finger PIP contracture secondary to Dupuytren's disease       Plan: The patient and I had a thorough discussion today.  We discussed the working diagnosis as well as several other potential alternative diagnoses.  Treatment options were discussed, both conservative and surgical.  Conservative treatment options would include things such as activity modifications, workplace modifications, a period of rest, oral vs topical OTC and prescription anti-inflammatory medications, occupational therapy, splinting/bracing, immobilization, corticosteroid injections, and others.   Surgical options were discussed as well.     At this time, we discussed treatment options.  We discussed observation, collagenase, and surgical excision.  He would like to discuss collagenase and I informed him that I do not offer this procedure myself but I would be happy to refer him to 1 of my partners that does.  Will refer to Dr. Sherry Lopez for further discussion of collagenase and further evaluation.  He may follow up with me as needed/basis.        Please do not hesitate to reach out to us via email, phone, or MyChart with any questions, concerns, or feedback.

## 2023-05-11 ENCOUNTER — OFFICE VISIT (OUTPATIENT)
Dept: ALLERGY | Facility: CLINIC | Age: 53
End: 2023-05-11
Payer: COMMERCIAL

## 2023-05-11 ENCOUNTER — LAB VISIT (OUTPATIENT)
Dept: LAB | Facility: HOSPITAL | Age: 53
End: 2023-05-11
Attending: ALLERGY & IMMUNOLOGY
Payer: COMMERCIAL

## 2023-05-11 VITALS — WEIGHT: 221.31 LBS | HEIGHT: 72 IN | BODY MASS INDEX: 29.97 KG/M2

## 2023-05-11 DIAGNOSIS — E11.9 TYPE 2 DIABETES MELLITUS WITHOUT COMPLICATION, WITHOUT LONG-TERM CURRENT USE OF INSULIN: ICD-10-CM

## 2023-05-11 DIAGNOSIS — J31.0 CHRONIC RHINITIS: ICD-10-CM

## 2023-05-11 DIAGNOSIS — H10.403 CHRONIC CONJUNCTIVITIS OF BOTH EYES, UNSPECIFIED CHRONIC CONJUNCTIVITIS TYPE: ICD-10-CM

## 2023-05-11 DIAGNOSIS — H10.403 CHRONIC CONJUNCTIVITIS OF BOTH EYES, UNSPECIFIED CHRONIC CONJUNCTIVITIS TYPE: Primary | ICD-10-CM

## 2023-05-11 LAB
ALBUMIN SERPL BCP-MCNC: 4.3 G/DL (ref 3.5–5.2)
ALP SERPL-CCNC: 224 U/L (ref 55–135)
ALT SERPL W/O P-5'-P-CCNC: 72 U/L (ref 10–44)
ANION GAP SERPL CALC-SCNC: 15 MMOL/L (ref 8–16)
AST SERPL-CCNC: 45 U/L (ref 10–40)
BASOPHILS # BLD AUTO: 0.04 K/UL (ref 0–0.2)
BASOPHILS NFR BLD: 0.8 % (ref 0–1.9)
BILIRUB SERPL-MCNC: 1.3 MG/DL (ref 0.1–1)
BUN SERPL-MCNC: 11 MG/DL (ref 6–20)
CALCIUM SERPL-MCNC: 9.9 MG/DL (ref 8.7–10.5)
CHLORIDE SERPL-SCNC: 99 MMOL/L (ref 95–110)
CO2 SERPL-SCNC: 20 MMOL/L (ref 23–29)
CREAT SERPL-MCNC: 1 MG/DL (ref 0.5–1.4)
DIFFERENTIAL METHOD: ABNORMAL
EOSINOPHIL # BLD AUTO: 0.1 K/UL (ref 0–0.5)
EOSINOPHIL NFR BLD: 2 % (ref 0–8)
ERYTHROCYTE [DISTWIDTH] IN BLOOD BY AUTOMATED COUNT: 12.3 % (ref 11.5–14.5)
EST. GFR  (NO RACE VARIABLE): >60 ML/MIN/1.73 M^2
ESTIMATED AVG GLUCOSE: 160 MG/DL (ref 68–131)
GLUCOSE SERPL-MCNC: 355 MG/DL (ref 70–110)
HBA1C MFR BLD: 7.2 % (ref 4–5.6)
HCT VFR BLD AUTO: 37.3 % (ref 40–54)
HGB BLD-MCNC: 15 G/DL (ref 14–18)
IGE SERPL-ACNC: 49 IU/ML (ref 0–100)
IMM GRANULOCYTES # BLD AUTO: 0.03 K/UL (ref 0–0.04)
IMM GRANULOCYTES NFR BLD AUTO: 0.6 % (ref 0–0.5)
LYMPHOCYTES # BLD AUTO: 1.6 K/UL (ref 1–4.8)
LYMPHOCYTES NFR BLD: 33.3 % (ref 18–48)
MCH RBC QN AUTO: 35.5 PG (ref 27–31)
MCHC RBC AUTO-ENTMCNC: 40.2 G/DL (ref 32–36)
MCV RBC AUTO: 88 FL (ref 82–98)
MONOCYTES # BLD AUTO: 0.4 K/UL (ref 0.3–1)
MONOCYTES NFR BLD: 8.1 % (ref 4–15)
NEUTROPHILS # BLD AUTO: 2.7 K/UL (ref 1.8–7.7)
NEUTROPHILS NFR BLD: 55.2 % (ref 38–73)
NRBC BLD-RTO: 0 /100 WBC
PLATELET # BLD AUTO: 118 K/UL (ref 150–450)
PMV BLD AUTO: 13.3 FL (ref 9.2–12.9)
POTASSIUM SERPL-SCNC: 4.1 MMOL/L (ref 3.5–5.1)
PROT SERPL-MCNC: 9.6 G/DL (ref 6–8.4)
RBC # BLD AUTO: 4.22 M/UL (ref 4.6–6.2)
SODIUM SERPL-SCNC: 134 MMOL/L (ref 136–145)
WBC # BLD AUTO: 4.93 K/UL (ref 3.9–12.7)

## 2023-05-11 PROCEDURE — 99204 OFFICE O/P NEW MOD 45 MIN: CPT | Mod: S$GLB,,, | Performed by: ALLERGY & IMMUNOLOGY

## 2023-05-11 PROCEDURE — 3008F PR BODY MASS INDEX (BMI) DOCUMENTED: ICD-10-PCS | Mod: CPTII,S$GLB,, | Performed by: ALLERGY & IMMUNOLOGY

## 2023-05-11 PROCEDURE — 82785 ASSAY OF IGE: CPT | Performed by: ALLERGY & IMMUNOLOGY

## 2023-05-11 PROCEDURE — 80053 COMPREHEN METABOLIC PANEL: CPT | Performed by: ALLERGY & IMMUNOLOGY

## 2023-05-11 PROCEDURE — 3008F BODY MASS INDEX DOCD: CPT | Mod: CPTII,S$GLB,, | Performed by: ALLERGY & IMMUNOLOGY

## 2023-05-11 PROCEDURE — 85025 COMPLETE CBC W/AUTO DIFF WBC: CPT | Performed by: ALLERGY & IMMUNOLOGY

## 2023-05-11 PROCEDURE — 86003 ALLG SPEC IGE CRUDE XTRC EA: CPT | Mod: 59 | Performed by: ALLERGY & IMMUNOLOGY

## 2023-05-11 PROCEDURE — 83036 HEMOGLOBIN GLYCOSYLATED A1C: CPT | Performed by: ALLERGY & IMMUNOLOGY

## 2023-05-11 PROCEDURE — 86003 ALLG SPEC IGE CRUDE XTRC EA: CPT | Performed by: ALLERGY & IMMUNOLOGY

## 2023-05-11 PROCEDURE — 1159F PR MEDICATION LIST DOCUMENTED IN MEDICAL RECORD: ICD-10-PCS | Mod: CPTII,S$GLB,, | Performed by: ALLERGY & IMMUNOLOGY

## 2023-05-11 PROCEDURE — 1160F PR REVIEW ALL MEDS BY PRESCRIBER/CLIN PHARMACIST DOCUMENTED: ICD-10-PCS | Mod: CPTII,S$GLB,, | Performed by: ALLERGY & IMMUNOLOGY

## 2023-05-11 PROCEDURE — 1159F MED LIST DOCD IN RCRD: CPT | Mod: CPTII,S$GLB,, | Performed by: ALLERGY & IMMUNOLOGY

## 2023-05-11 PROCEDURE — 36415 COLL VENOUS BLD VENIPUNCTURE: CPT | Performed by: ALLERGY & IMMUNOLOGY

## 2023-05-11 PROCEDURE — 99999 PR PBB SHADOW E&M-EST. PATIENT-LVL III: ICD-10-PCS | Mod: PBBFAC,,, | Performed by: ALLERGY & IMMUNOLOGY

## 2023-05-11 PROCEDURE — 99204 PR OFFICE/OUTPT VISIT, NEW, LEVL IV, 45-59 MIN: ICD-10-PCS | Mod: S$GLB,,, | Performed by: ALLERGY & IMMUNOLOGY

## 2023-05-11 PROCEDURE — 1160F RVW MEDS BY RX/DR IN RCRD: CPT | Mod: CPTII,S$GLB,, | Performed by: ALLERGY & IMMUNOLOGY

## 2023-05-11 PROCEDURE — 99999 PR PBB SHADOW E&M-EST. PATIENT-LVL III: CPT | Mod: PBBFAC,,, | Performed by: ALLERGY & IMMUNOLOGY

## 2023-05-11 NOTE — PROGRESS NOTES
Amando Smith is a 53-year-old male who presents to clinic today for evaluation of a possible allergy.  He is here alone.  His primary care physician is Dr. Darnell Delgado.  He does have an appointment with an [facility] physician in June.    He has had exposure to a cat for the past 10 years. He then got  and it has been inside his house for the past 2-1/2 years.  He has had itching of his eyes with redness and tearing for the past two years. It has been getting worse in the last several weeks.     This is also associated with tingling around both eyes and across his forehead.  He thinks that the cat is making the veins on the temples large.    He saw an ophthalmologist or optometrist in December.  He got new glasses.  He discussed these symptoms with them. They did not see any abnormality.    He has been taking Zyrtec daily. Before this he was taking Claritin for 10 days without any benefit.  He has been using azelastine eyedrops. He does think that this has helped some.    He denies any rhinitis. He denies any cough, wheezing, or shortness of breath.     He denies any history of asthma.    For ROS, FH, SH please see Allergy and Asthma Questionnaire dated today.    Some relevant pertinent positives:    Review of Systems/PMH:  He has diabetes and used to be on Glucophage.  He did not like the way it made him feel and he discontinued it. He has not had his blood sugar checked recently.    Family History:  Negative for atopic disease.    Home environment:  He has lived in the same house for the past four years. There was no water damage. There is no evidence of mold. There is no carpeting in the bedroom. There is one cat that lives inside the house.  He thinks he has symptoms around it.    Social History:  He is a  at [employer].  He works at least 14 hours a day six days a week.    Physical Examination:  General: Well-developed, well-nourished, no acute distress.  Head: No sinus  tenderness.  Eyes: Conjunctivae:  Mild conjunctival injection.  Ears: EAC's clear.  TM's clear.  No pre-auricular nodes.  Nose: Nasal Mucosa:  Pink.  Septum: No apparent deviation.  Turbinates:  No significant edema.  Polyps/Mass:  None visible.  Teeth/Gums:  No bleeding noted.  Oropharynx: No exudates.  Neck: Supple without thyromegaly. No cervical lymphadenopathy.    Respiratory/Chest: Effort: Good.  Auscultation:  Clear bilaterally.  Cardiovascular:  No murmur, rubs, or gallop heard.   GI:  Non-tender.  No masses.  No organomegaly.  Extremities:  No cyanosis, clubbing, or edema.  Skin: Good turgor.  No urticaria or angioedema.  Neuro/Psych: Oriented x 3.    Assessment:  1. Chronic conjunctivitis, consider allergic.   2. Diabetes probably uncontrolled.    Recommendations:  1. Laboratory as ordered.  2. Continue Zyrtec for now.   3. Continue azelastine for now.   4. Consider skin testing off antihistamines if needed.   5. He will check results on [facility] and schedule if needed.

## 2023-05-12 ENCOUNTER — TELEPHONE (OUTPATIENT)
Dept: ALLERGY | Facility: CLINIC | Age: 53
End: 2023-05-12
Payer: COMMERCIAL

## 2023-05-12 NOTE — TELEPHONE ENCOUNTER
Informed pt to reach out to pcp. Pt v/u.    ----- Message from KRYSTAL Bush III, MD sent at 5/12/2023 10:38 AM CDT -----  Regarding: RE: pt advice  Contact: pt [phone]  He needs to call his primary care physician Dr. Darnell Delgado.  ----- Message -----  From: Alicia Quiros LPN  Sent: 5/12/2023   9:43 AM CDT  To: KRYSTAL Bush III, MD  Subject: FW: pt advice                                    Pt is questioning about you prescribing something for his diabetes. His office visit was yesterday (5/11/23). He stated he saw the b/w was on the portal. I explained the pt is able to see it before it is finalized. He would like to know the results of th also. Please advise, thank you.  ----- Message -----  From: Joanne Sotelo MA  Sent: 5/12/2023   9:30 AM CDT  To: Eleazar Youssef III Staff  Subject: FW: pt advice                                      ----- Message -----  From: Kassi Mejia  Sent: 5/12/2023   9:19 AM CDT  To: Eleazar MOYA Staff  Subject: pt advice                                        Missed Callback     Pt returning callback from missed call. Requesting to speak with somebody in Dr. Bush  office. Please call.

## 2023-05-12 NOTE — TELEPHONE ENCOUNTER
Dup please see previous msg    ----- Message from Joanne Sotelo MA sent at 5/12/2023  9:30 AM CDT -----  Regarding: FW: pt advice  Contact: pt [phone]    ----- Message -----  From: Kassi Mejia  Sent: 5/12/2023   9:19 AM CDT  To: Eleazar Camacho  Subject: pt advice                                        Missed Callback     Pt returning callback from missed call. Requesting to speak with somebody in Dr. Bush  office. Please call.

## 2023-05-12 NOTE — TELEPHONE ENCOUNTER
Dup msg please see previous    ----- Message from Cynthia Johnson sent at 5/12/2023 12:03 PM CDT -----  YONATAN BROWN calling regarding Patient Advice for wanting to speak to the nurse about if the doctor can prescribe him medication for his diabetes stating he did labs yesterday and it shows it is elevated, the Glucose is 450 and A1C is 7.2 , call back to inform if this can be done or if he needs to consult his Endocrine doctor. [phone]

## 2023-05-12 NOTE — TELEPHONE ENCOUNTER
Attempted to call ptMARIA LM      ----- Message from Elana Shell sent at 5/12/2023  8:05 AM CDT -----  Type: Patient Call Back         Who called: Pt          What is the request in detail: Pt called in regarding wondering if possible Dr Lama will prescribe something for his blood Sugar ?  Pt was seen on yesterday             Can the clinic reply by [facility]?no          Would the patient rather a call back or a response via [facility]?call back          Best call back number: [phone] or (mobile) [phone]         Additional Information:           Thank You

## 2023-05-15 LAB
A ALTERNATA IGE QN: <0.1 KU/L
A FUMIGATUS IGE QN: <0.1 KU/L
BERMUDA GRASS IGE QN: <0.1 KU/L
CAT DANDER IGE QN: <0.1 KU/L
CEDAR IGE QN: <0.1 KU/L
D FARINAE IGE QN: 2.09 KU/L
D PTERONYSS IGE QN: 2.14 KU/L
DEPRECATED A ALTERNATA IGE RAST QL: NORMAL
DEPRECATED A FUMIGATUS IGE RAST QL: NORMAL
DEPRECATED BERMUDA GRASS IGE RAST QL: NORMAL
DEPRECATED CAT DANDER IGE RAST QL: NORMAL
DEPRECATED CEDAR IGE RAST QL: NORMAL
DEPRECATED D FARINAE IGE RAST QL: ABNORMAL
DEPRECATED D PTERONYSS IGE RAST QL: ABNORMAL
DEPRECATED DOG DANDER IGE RAST QL: ABNORMAL
DEPRECATED ELDER IGE RAST QL: NORMAL
DEPRECATED ENGL PLANTAIN IGE RAST QL: NORMAL
DEPRECATED PECAN/HICK TREE IGE RAST QL: NORMAL
DEPRECATED ROACH IGE RAST QL: NORMAL
DEPRECATED TIMOTHY IGE RAST QL: NORMAL
DEPRECATED WEST RAGWEED IGE RAST QL: NORMAL
DEPRECATED WHITE OAK IGE RAST QL: NORMAL
DOG DANDER IGE QN: 0.11 KU/L
ELDER IGE QN: <0.1 KU/L
ENGL PLANTAIN IGE QN: <0.1 KU/L
PECAN/HICK TREE IGE QN: <0.1 KU/L
ROACH IGE QN: <0.1 KU/L
TIMOTHY IGE QN: <0.1 KU/L
WEST RAGWEED IGE QN: <0.1 KU/L
WHITE OAK IGE QN: <0.1 KU/L

## 2023-05-31 ENCOUNTER — TELEPHONE (OUTPATIENT)
Dept: ALLERGY | Facility: CLINIC | Age: 53
End: 2023-05-31
Payer: COMMERCIAL

## 2023-05-31 NOTE — TELEPHONE ENCOUNTER
----- Message from Frances Vivas sent at 5/31/2023  1:18 PM CDT -----  Regarding: Allergy test results from 5/11  Contact: @[phone]  Pt is calling for updates on allergy test results from 5/11, wanting to know if allergic to cats or something else, and how to follow up @[phone]    Needs explanation of results

## 2023-06-01 ENCOUNTER — TELEPHONE (OUTPATIENT)
Dept: ALLERGY | Facility: CLINIC | Age: 53
End: 2023-06-01

## 2023-06-01 NOTE — TELEPHONE ENCOUNTER
Good morning Dr. Bush,      I only see that patient was notified of his glucose. I did not see that he was notified of the allergens. I will notify him of those.     Thank you

## 2023-06-01 NOTE — TELEPHONE ENCOUNTER
----- Message from Cynthia Johnson sent at 6/1/2023  1:47 PM CDT -----  05/11 to inform if allergic to animals, PT stated that he called several times and no reply, please call back to inform, [phone]

## 2023-06-01 NOTE — TELEPHONE ENCOUNTER
Discussed no cat allergy with patient. Offered f/u pt declined. Pt did follow up with PCP. He wanted to let us know that his itchy watery eyes is not from his diabetes and his allergy results were not relayed to him via the patient portal.  Discussed positive DM and avoidance measures.

## 2023-06-22 ENCOUNTER — TELEPHONE (OUTPATIENT)
Dept: ALLERGY | Facility: CLINIC | Age: 53
End: 2023-06-22
Payer: COMMERCIAL

## 2023-06-22 NOTE — TELEPHONE ENCOUNTER
----- Message from Arline Hayes sent at 6/21/2023  8:29 AM CDT -----  Regarding: Return Call From Nurse (Medication)  Contact: @[phone]  Pt requesting a call back from the nurse regarding medication Odactara.  Please call to discuss further.

## 2023-06-26 ENCOUNTER — TELEPHONE (OUTPATIENT)
Dept: ALLERGY | Facility: CLINIC | Age: 53
End: 2023-06-26
Payer: COMMERCIAL

## 2023-06-26 NOTE — TELEPHONE ENCOUNTER
----- Message from Martha Emmanuel MA sent at 6/23/2023  8:48 AM CDT -----  Regarding: FW: Returned missed call about test results  Contact: @ [phone]  I see you called this pt  ----- Message -----  From: Noel Amaya  Sent: 6/22/2023   4:25 PM CDT  To: Eleazar Youssef III Staff  Subject: Returned missed call about test results          Pt called in stating that he missed a call from the office. Pt states that he has a dust mite problem and wants to know what he can do to get some relief. Pt also wants to go over results from his allergy test that he took on 5/11/2023. Please call pt to discuss further.

## 2023-07-07 ENCOUNTER — TELEPHONE (OUTPATIENT)
Dept: ALLERGY | Facility: CLINIC | Age: 53
End: 2023-07-07
Payer: COMMERCIAL

## 2023-07-07 NOTE — TELEPHONE ENCOUNTER
----- Message from Cassie Landry MA sent at 6/26/2023  8:09 AM CDT -----  Regarding: RE: Returned missed call about test results  Contact: @ [phone]  I called this pt because it said they had a question about their medication. Honestly, I was just assisting with messages, I have no idea how your department handles test results.      ----- Message -----  From: Martha Emmanuel MA  Sent: 6/23/2023   8:48 AM CDT  To: Cassie Landry MA  Subject: FW: Returned missed call about test results      I see you called this pt  ----- Message -----  From: Noel Amaya  Sent: 6/22/2023   4:25 PM CDT  To: Eleazar Youssef III Staff  Subject: Returned missed call about test results          Pt called in stating that he missed a call from the office. Pt states that he has a dust mite problem and wants to know what he can do to get some relief. Pt also wants to go over results from his allergy test that he took on 5/11/2023. Please call pt to discuss further.

## 2023-07-07 NOTE — TELEPHONE ENCOUNTER
"Called pt in regards to below message.    Pt also verbalized to Thank the provider  and let him know he did him a favor by misdiagnosing," saying it was his blood sugar and it was not. It got him back on track with going to the gym and making better health choices." Also he is doing better with his problem and does not need any advice  at this time.      Pt called in stating that he missed a call from the office. Pt states that he has a dust mite problem and wants to know what he can do to get some relief.  "

## 2023-08-21 ENCOUNTER — TELEPHONE (OUTPATIENT)
Dept: ALLERGY | Facility: CLINIC | Age: 53
End: 2023-08-21
Payer: COMMERCIAL

## 2023-08-21 NOTE — TELEPHONE ENCOUNTER
----- Message from Yoli Quick sent at 8/21/2023  9:54 AM CDT -----  Regarding: Prescription  Contact: Pt [phone]  Pt is calling to state he is still having trouble with his reyes running and wants to know if provider can prescribe something please call

## 2023-08-21 NOTE — TELEPHONE ENCOUNTER
Called patient in regards to message sent. I offered the patient the soonest appointment available, however he did not want to wait that long. He was advised to contact PCP. Pt states that his eyes are red, watery, and he has facial pressure

## 2024-06-21 ENCOUNTER — OFFICE VISIT (OUTPATIENT)
Dept: URGENT CARE | Facility: CLINIC | Age: 54
End: 2024-06-21
Payer: COMMERCIAL

## 2024-06-21 VITALS
TEMPERATURE: 98 F | HEART RATE: 77 BPM | DIASTOLIC BLOOD PRESSURE: 85 MMHG | HEIGHT: 72 IN | WEIGHT: 221 LBS | OXYGEN SATURATION: 95 % | BODY MASS INDEX: 29.93 KG/M2 | SYSTOLIC BLOOD PRESSURE: 145 MMHG | RESPIRATION RATE: 18 BRPM

## 2024-06-21 DIAGNOSIS — B35.4 TINEA CORPORIS: Primary | ICD-10-CM

## 2024-06-21 RX ORDER — KETOCONAZOLE 20 MG/G
CREAM TOPICAL DAILY
Qty: 15 G | Refills: 0 | Status: SHIPPED | OUTPATIENT
Start: 2024-06-21 | End: 2024-07-05

## 2024-06-21 RX ORDER — FLUCONAZOLE 150 MG/1
150 TABLET ORAL DAILY
Qty: 1 TABLET | Refills: 0 | Status: SHIPPED | OUTPATIENT
Start: 2024-06-21 | End: 2024-06-22

## 2024-06-21 NOTE — PATIENT INSTRUCTIONS
Rash: One pill by mouth, Topical cream once daily for 2 weeks  Monitor for new or worsening of symptoms  Follow up with primary care if no improvement  Please return here or go to the Emergency Department for any concerns or worsening of condition.  If you were prescribed antibiotics, please take them to completion.  If you were prescribed a narcotic medication, do not drive or operate heavy equipment or machinery while taking these medications.  Please follow up with your primary care doctor or specialist as needed.    If you  smoke, please stop smoking.

## 2024-06-21 NOTE — PROGRESS NOTES
Subjective:      Patient ID: Amando Smith is a 54 y.o. male.    Vitals:  height is 6' (1.829 m) and weight is 100.2 kg (221 lb). His oral temperature is 97.6 °F (36.4 °C). His blood pressure is 145/85 (abnormal) and his pulse is 77. His respiration is 18 and oxygen saturation is 95%.     Chief Complaint: Rash    Pt is a 53 yo male presenting with rash on lower back, left lateral thigh, lower abdomen area. Onset of symptoms was a few days ago, but he has experienced this in the past for 4 and diagnosed with fungal infection.  He was treated with oral medication and topical cream which resolved the issue. Patient tried using jock itch treatment from over-the-counter this time without relief.  He values rashes from where he has felt very tight due to recent weight loss.  Patient does sweat often at the gym and work as a  but he states that he keeps up with his hygiene.       Rash  This is a new problem. The current episode started 1 to 4 weeks ago. The affected locations include the left upper leg, left lower leg, abdomen and back. The rash is characterized by itchiness and redness. It is unknown if there was an exposure to a precipitant. Pertinent negatives include no anorexia, congestion, cough, diarrhea, eye pain, facial edema, fatigue, fever, joint pain, nail changes, rhinorrhea, shortness of breath, sore throat or vomiting. Past treatments include anti-itch cream. There is no history of allergies, asthma, eczema or varicella.       Constitution: Negative for activity change, appetite change, chills, fatigue and fever.   HENT:  Negative for ear pain, congestion, postnasal drip, sinus pain, sinus pressure and sore throat.    Neck: Negative for neck pain and neck swelling.   Cardiovascular:  Negative for chest pain and sob on exertion.   Eyes:  Negative for eye trauma, eye discharge, eye pain and eye redness.   Respiratory:  Negative for cough, shortness of breath and wheezing.    Gastrointestinal:   Negative for abdominal pain, nausea, vomiting, constipation and diarrhea.   Genitourinary:  Negative for dysuria, frequency, urgency and urine decreased.   Musculoskeletal:  Negative for pain, joint pain, joint swelling, abnormal ROM of joint and muscle ache.   Skin:  Positive for rash. Negative for color change, laceration and erythema.   Neurological:  Negative for dizziness, light-headedness, altered mental status and numbness.   Psychiatric/Behavioral:  Negative for altered mental status and confusion.       Objective:     Physical Exam   Constitutional: He is oriented to person, place, and time. He appears well-developed. He is cooperative.  Non-toxic appearance. He does not appear ill. No distress.      Comments:Pt sitting erect on examination table. No acute respiratory distress, no use of accessory muscles, no notice of nasal flaring.        HENT:   Head: Normocephalic and atraumatic.   Ears:   Right Ear: Hearing, tympanic membrane, external ear and ear canal normal.   Left Ear: Hearing, tympanic membrane, external ear and ear canal normal.   Nose: Nose normal. No mucosal edema, rhinorrhea, nasal deformity or congestion. No epistaxis. Right sinus exhibits no maxillary sinus tenderness and no frontal sinus tenderness. Left sinus exhibits no maxillary sinus tenderness and no frontal sinus tenderness.   Mouth/Throat: Uvula is midline, oropharynx is clear and moist and mucous membranes are normal. Mucous membranes are moist. No trismus in the jaw. Normal dentition. No uvula swelling. No oropharyngeal exudate, posterior oropharyngeal edema or posterior oropharyngeal erythema. Oropharynx is clear.   Eyes: Conjunctivae and lids are normal. Pupils are equal, round, and reactive to light. No scleral icterus. Extraocular movement intact   Neck: Trachea normal and phonation normal. Neck supple. No edema present. No erythema present. No neck rigidity present.   Cardiovascular: Normal rate, regular rhythm, normal heart  sounds and normal pulses.   Pulmonary/Chest: Effort normal and breath sounds normal. No respiratory distress. He has no decreased breath sounds. He has no rhonchi.   Abdominal: Normal appearance.   Musculoskeletal: Normal range of motion.         General: No deformity. Normal range of motion.      Left hand: He exhibits laceration.   Neurological: He is alert and oriented to person, place, and time. He exhibits normal muscle tone. Coordination normal.   Skin: Skin is warm, dry, intact, not diaphoretic, not pale, rash and papular. Lacerations - upper ext.:  left handNo erythema        Psychiatric: His speech is normal and behavior is normal. Judgment and thought content normal.   Nursing note and vitals reviewed.      Assessment:     1. Tinea corporis        Plan:   I have reviewed the patient chart and pertinent past imaging/labs.      Tinea corporis  -     ketoconazole (NIZORAL) 2 % cream; Apply topically once daily. for 14 days  Dispense: 15 g; Refill: 0  -     fluconazole (DIFLUCAN) 150 MG Tab; Take 1 tablet (150 mg total) by mouth once daily. for 1 day  Dispense: 1 tablet; Refill: 0

## 2024-10-27 ENCOUNTER — OFFICE VISIT (OUTPATIENT)
Dept: URGENT CARE | Facility: CLINIC | Age: 54
End: 2024-10-27
Payer: OTHER MISCELLANEOUS

## 2024-10-27 VITALS
HEART RATE: 81 BPM | WEIGHT: 221 LBS | HEIGHT: 72 IN | DIASTOLIC BLOOD PRESSURE: 84 MMHG | OXYGEN SATURATION: 98 % | TEMPERATURE: 98 F | BODY MASS INDEX: 29.93 KG/M2 | SYSTOLIC BLOOD PRESSURE: 144 MMHG | RESPIRATION RATE: 17 BRPM

## 2024-10-27 DIAGNOSIS — M25.562 ACUTE PAIN OF LEFT KNEE: ICD-10-CM

## 2024-10-27 DIAGNOSIS — S83.92XA SPRAIN OF LEFT KNEE, UNSPECIFIED LIGAMENT, INITIAL ENCOUNTER: ICD-10-CM

## 2024-10-27 DIAGNOSIS — L03.116 CELLULITIS OF LEFT LOWER EXTREMITY: Primary | ICD-10-CM

## 2024-10-27 PROCEDURE — 99214 OFFICE O/P EST MOD 30 MIN: CPT | Mod: 25,S$GLB,,

## 2024-10-27 PROCEDURE — 96372 THER/PROPH/DIAG INJ SC/IM: CPT | Mod: S$GLB,,,

## 2024-10-27 PROCEDURE — 73564 X-RAY EXAM KNEE 4 OR MORE: CPT | Mod: FY,LT,S$GLB, | Performed by: INTERNAL MEDICINE

## 2024-10-27 RX ORDER — CEFTRIAXONE 1 G/1
1 INJECTION, POWDER, FOR SOLUTION INTRAMUSCULAR; INTRAVENOUS
Status: COMPLETED | OUTPATIENT
Start: 2024-10-27 | End: 2024-10-27

## 2024-10-27 RX ORDER — LIDOCAINE HYDROCHLORIDE 10 MG/ML
1 INJECTION, SOLUTION INFILTRATION; PERINEURAL
Status: DISCONTINUED | OUTPATIENT
Start: 2024-10-27 | End: 2024-10-27

## 2024-10-27 RX ORDER — DOXYCYCLINE HYCLATE 100 MG
100 TABLET ORAL 2 TIMES DAILY
Qty: 10 TABLET | Refills: 0 | Status: SHIPPED | OUTPATIENT
Start: 2024-10-27 | End: 2024-11-01

## 2024-10-27 RX ORDER — IBUPROFEN 800 MG/1
800 TABLET ORAL 3 TIMES DAILY
Qty: 21 TABLET | Refills: 0 | Status: SHIPPED | OUTPATIENT
Start: 2024-10-27

## 2024-10-27 RX ORDER — HYDROCODONE BITARTRATE AND ACETAMINOPHEN 5; 325 MG/1; MG/1
1 TABLET ORAL EVERY 8 HOURS PRN
Qty: 12 TABLET | Refills: 0 | Status: SHIPPED | OUTPATIENT
Start: 2024-10-27

## 2024-10-27 RX ORDER — LIDOCAINE HYDROCHLORIDE 10 MG/ML
2 INJECTION, SOLUTION INFILTRATION; PERINEURAL
Status: COMPLETED | OUTPATIENT
Start: 2024-10-27 | End: 2024-10-27

## 2024-10-27 RX ORDER — HYDROCODONE BITARTRATE AND ACETAMINOPHEN 5; 325 MG/1; MG/1
1 TABLET ORAL EVERY 8 HOURS PRN
Qty: 12 TABLET | Refills: 0 | Status: SHIPPED | OUTPATIENT
Start: 2024-10-27 | End: 2024-10-27 | Stop reason: SDUPTHER

## 2024-10-27 RX ORDER — DOXYCYCLINE HYCLATE 100 MG
100 TABLET ORAL 2 TIMES DAILY
Qty: 14 TABLET | Refills: 0 | Status: SHIPPED | OUTPATIENT
Start: 2024-10-27 | End: 2024-10-27

## 2024-10-27 RX ADMIN — CEFTRIAXONE 1 G: 1 INJECTION, POWDER, FOR SOLUTION INTRAMUSCULAR; INTRAVENOUS at 10:10

## 2024-10-27 RX ADMIN — LIDOCAINE HYDROCHLORIDE 2 ML: 10 INJECTION, SOLUTION INFILTRATION; PERINEURAL at 10:10

## 2024-10-28 ENCOUNTER — TELEPHONE (OUTPATIENT)
Dept: URGENT CARE | Facility: CLINIC | Age: 54
End: 2024-10-28
Payer: COMMERCIAL

## 2025-03-03 ENCOUNTER — OFFICE VISIT (OUTPATIENT)
Dept: URGENT CARE | Facility: CLINIC | Age: 55
End: 2025-03-03
Payer: COMMERCIAL

## 2025-03-03 VITALS
HEIGHT: 72 IN | HEART RATE: 71 BPM | DIASTOLIC BLOOD PRESSURE: 81 MMHG | WEIGHT: 221 LBS | TEMPERATURE: 99 F | OXYGEN SATURATION: 98 % | SYSTOLIC BLOOD PRESSURE: 136 MMHG | BODY MASS INDEX: 29.93 KG/M2 | RESPIRATION RATE: 16 BRPM

## 2025-03-03 DIAGNOSIS — S05.01XA ABRASION OF RIGHT CORNEA, INITIAL ENCOUNTER: Primary | ICD-10-CM

## 2025-03-03 DIAGNOSIS — R22.0 RIGHT FACIAL SWELLING: ICD-10-CM

## 2025-03-03 DIAGNOSIS — H04.201 EYE TEARING, RIGHT: ICD-10-CM

## 2025-03-03 PROCEDURE — 99213 OFFICE O/P EST LOW 20 MIN: CPT | Mod: S$GLB,,, | Performed by: NURSE PRACTITIONER

## 2025-03-03 RX ORDER — METHYLPREDNISOLONE 4 MG/1
TABLET ORAL
Qty: 21 EACH | Refills: 0 | Status: SHIPPED | OUTPATIENT
Start: 2025-03-03 | End: 2025-03-24

## 2025-03-03 RX ORDER — CIPROFLOXACIN HYDROCHLORIDE 3 MG/ML
1 SOLUTION/ DROPS OPHTHALMIC EVERY 4 HOURS
Qty: 2.5 ML | Refills: 0 | Status: SHIPPED | OUTPATIENT
Start: 2025-03-03 | End: 2025-03-10

## 2025-03-03 NOTE — PROGRESS NOTES
Subjective:      Patient ID: Amando Smith is a 54 y.o. male.    Vitals:  height is 6' (1.829 m) and weight is 100.2 kg (221 lb). His oral temperature is 98.5 °F (36.9 °C). His blood pressure is 136/81 and his pulse is 71. His respiration is 16 and oxygen saturation is 98%.     Chief Complaint: Eye Problem    This pt complains of right eye problem x 3 days. Pt states his right eye constantly hernandez. Pt also states the right side of his face his swollen. Pt also reports he has been dealing with conjunctivitis due to his diabetes and sees ophthalmology. But usually it does not cause this constant tearing is has. No nausea, vomiting, diarrhea, or fever.  Denies pain or changes in vision.    Eye Problem   The right eye is affected. This is a new problem. The current episode started in the past 7 days. The problem occurs constantly. The problem has been unchanged. Associated symptoms include eye redness and itching. Pertinent negatives include no blurred vision, eye discharge, double vision, fever, nausea, photophobia or vomiting.       Constitution: Negative for fatigue and fever.   Eyes:  Positive for eye itching and eye redness. Negative for eye discharge, eye pain, photophobia, vision loss, double vision and blurred vision.   Gastrointestinal:  Negative for nausea and vomiting.      Objective:     Physical Exam   Constitutional: He is oriented to person, place, and time. He appears well-developed.   HENT:   Head: Normocephalic and atraumatic.       Ears:   Right Ear: External ear normal.   Left Ear: External ear normal.   Nose: Nose normal.   Mouth/Throat: Oropharynx is clear and moist.   Eyes: EOM and lids are normal. Pupils are equal, round, and reactive to light. Right eye exhibits no chemosis, no discharge (constant tearing), no exudate and no hordeolum. No foreign body present in the right eye. Right conjunctiva is injected. Right conjunctiva has no hemorrhage. Right eye exhibits normal extraocular motion and no  nystagmus.   Slit lamp exam:       The right eye shows corneal abrasion and fluorescein uptake.       Neck: Trachea normal and phonation normal. Neck supple.   Musculoskeletal: Normal range of motion.         General: Normal range of motion.   Neurological: He is alert and oriented to person, place, and time.   Skin: Skin is warm, dry and intact.   Psychiatric: His speech is normal and behavior is normal. Judgment and thought content normal.   Nursing note and vitals reviewed.      Assessment:     1. Abrasion of right cornea, initial encounter    2. Eye tearing, right    3. Right facial swelling        Plan:       Abrasion of right cornea, initial encounter  -     ciprofloxacin HCl (CILOXAN) 0.3 % ophthalmic solution; Place 1 drop into the right eye every 4 (four) hours. for 7 days  Dispense: 2.5 mL; Refill: 0    Eye tearing, right  -     fluorescein-benoxinate 0.25-0.4% ophthalmic solution 1 drop    Right facial swelling  -     methylPREDNISolone (MEDROL DOSEPACK) 4 mg tablet; use as directed  Dispense: 21 each; Refill: 0             Patient Instructions   Cipro eye drops- every 4 hours to right eye while awake    Medrol dose pack- take as directed on packaging for facial swelling    Antihistamine such as Claritin or Zyrtec for allergy symptoms    Tylenol or Ibuprofen as needed for pain    Wearing an eye patch may help reduce eye tearing    Follow up with ophthalmology if symptoms persist

## 2025-03-03 NOTE — PATIENT INSTRUCTIONS
Cipro eye drops- every 4 hours to right eye while awake    Medrol dose pack- take as directed on packaging for facial swelling    Antihistamine such as Claritin or Zyrtec for allergy symptoms    Tylenol or Ibuprofen as needed for pain    Wearing an eye patch may help reduce eye tearing    Follow up with ophthalmology if symptoms persist